# Patient Record
Sex: FEMALE | Race: WHITE | NOT HISPANIC OR LATINO | Employment: UNEMPLOYED | URBAN - METROPOLITAN AREA
[De-identification: names, ages, dates, MRNs, and addresses within clinical notes are randomized per-mention and may not be internally consistent; named-entity substitution may affect disease eponyms.]

---

## 2022-09-09 ENCOUNTER — HOSPITAL ENCOUNTER (EMERGENCY)
Facility: HOSPITAL | Age: 22
Discharge: HOME | End: 2022-09-09
Attending: EMERGENCY MEDICINE | Admitting: EMERGENCY MEDICINE

## 2022-09-09 VITALS
RESPIRATION RATE: 20 BRPM | SYSTOLIC BLOOD PRESSURE: 117 MMHG | BODY MASS INDEX: 54.75 KG/M2 | HEART RATE: 109 BPM | HEIGHT: 61 IN | DIASTOLIC BLOOD PRESSURE: 81 MMHG | TEMPERATURE: 98.3 F | OXYGEN SATURATION: 99 % | WEIGHT: 290 LBS

## 2022-09-09 DIAGNOSIS — L30.9 DERMATITIS: Primary | ICD-10-CM

## 2022-09-09 DIAGNOSIS — H60.12 CELLULITIS OF LEFT EAR: ICD-10-CM

## 2022-09-09 PROCEDURE — 63710000001 PREDNISONE PER 1 MG: Performed by: EMERGENCY MEDICINE

## 2022-09-09 PROCEDURE — 99283 EMERGENCY DEPT VISIT LOW MDM: CPT

## 2022-09-09 RX ORDER — CEPHALEXIN 500 MG/1
500 CAPSULE ORAL 3 TIMES DAILY
Qty: 15 CAPSULE | Refills: 0 | Status: SHIPPED | OUTPATIENT
Start: 2022-09-09 | End: 2022-09-09 | Stop reason: SDUPTHER

## 2022-09-09 RX ORDER — CEPHALEXIN 500 MG/1
500 CAPSULE ORAL 3 TIMES DAILY
Qty: 21 CAPSULE | Refills: 0 | Status: SHIPPED | OUTPATIENT
Start: 2022-09-09

## 2022-09-09 RX ORDER — PREDNISONE 20 MG/1
60 TABLET ORAL ONCE
Status: COMPLETED | OUTPATIENT
Start: 2022-09-09 | End: 2022-09-09

## 2022-09-09 RX ORDER — METHYLPREDNISOLONE 4 MG/1
TABLET ORAL
Qty: 21 TABLET | Refills: 0 | Status: SHIPPED | OUTPATIENT
Start: 2022-09-09

## 2022-09-09 RX ORDER — CEPHALEXIN 500 MG/1
500 CAPSULE ORAL 3 TIMES DAILY
Qty: 21 CAPSULE | Refills: 0 | Status: SHIPPED | OUTPATIENT
Start: 2022-09-09 | End: 2022-09-09 | Stop reason: SDUPTHER

## 2022-09-09 RX ORDER — CEPHALEXIN 500 MG/1
500 CAPSULE ORAL ONCE
Status: COMPLETED | OUTPATIENT
Start: 2022-09-09 | End: 2022-09-09

## 2022-09-09 RX ADMIN — PREDNISONE 60 MG: 20 TABLET ORAL at 20:59

## 2022-09-09 RX ADMIN — CEPHALEXIN 500 MG: 500 CAPSULE ORAL at 20:59

## 2022-09-10 NOTE — ED PROVIDER NOTES
Subjective   PIT  History of Present Illness    Chief complaint: Rash    Location: Left ear, lower abdomen    Quality/Severity: Itches    Timing/Onset/Duration: Started last night    Modifying Factors: Nothing makes it better or worse    Associated Symptoms: No headache.  No fever chills or cough.  No sore throat earache or nasal congestion.  No chest pain or shortness of breath.  No abdominal pain.  No diarrhea or burning when she urinates.  No nausea or vomiting.    Narrative: This 22-year-old female presents with a rash on the left ear, trunk, and below the pannus.  This started last night.  There is been no new lotions, perfumes, soaps, shampoos, detergents or colognes or foods.  The right ear was red last night, tonight its the left ear.    PCP:Nani Wong APRN  History of Present Illness     Medication List      You have not been prescribed any medications.         Review of Systems   Constitutional: Negative for chills and fever.   HENT: Negative for congestion, ear pain and sore throat.    Eyes: Negative for redness.   Respiratory: Negative for cough and shortness of breath.    Cardiovascular: Negative for chest pain.   Gastrointestinal: Negative for abdominal pain, diarrhea, nausea and vomiting.   Genitourinary: Negative for dysuria.   Musculoskeletal: Negative for back pain.   Skin: Positive for rash.   Neurological: Negative for headaches.       Past Medical History:   Diagnosis Date   • Cleft palate    • History of placement of ear tubes        No Known Allergies    History reviewed. No pertinent surgical history.    History reviewed. No pertinent family history.    Social History     Socioeconomic History   • Marital status: Single   Tobacco Use   • Smoking status: Never Smoker   • Smokeless tobacco: Never Used   Substance and Sexual Activity   • Alcohol use: Never   • Drug use: Never           Objective   Physical Exam  Vitals (The temperature is 98.3 °F, pulse 109, respirations 20, /81, room  air pulse ox 99%.) reviewed.   Constitutional:       Comments: Obese   HENT:      Head: Normocephalic and atraumatic.      Right Ear: Tympanic membrane normal.      Ears:      Comments: Congenitally small left external auditory canal     Nose: Nose normal. No congestion or rhinorrhea.      Mouth/Throat:      Mouth: Mucous membranes are moist.      Pharynx: No oropharyngeal exudate or posterior oropharyngeal erythema.   Eyes:      General:         Right eye: No discharge.         Left eye: No discharge.   Cardiovascular:      Rate and Rhythm: Normal rate and regular rhythm.      Pulses: Normal pulses.      Heart sounds: Normal heart sounds. No murmur heard.    No friction rub. No gallop.   Pulmonary:      Effort: Pulmonary effort is normal.   Abdominal:      General: Bowel sounds are normal. There is no distension.      Palpations: There is no mass.      Tenderness: There is no abdominal tenderness. There is no guarding or rebound.      Hernia: No hernia is present.   Musculoskeletal:         General: No swelling or tenderness. Normal range of motion.      Cervical back: Normal range of motion and neck supple. No rigidity.   Skin:     General: Skin is warm and dry.      Findings: Rash (The left ear is erythematous and blanches to the touch.  There is rash underneath the pannus.  There is 1 area on the right side where there is some skin breakdown there is a scattered erythematous rash on the trunk.  There is no purpura or petechiae) present.   Neurological:      General: No focal deficit present.      Mental Status: She is alert and oriented to person, place, and time.      Cranial Nerves: No cranial nerve deficit.      Sensory: No sensory deficit.      Motor: No weakness.         Procedures           ED Course      20:49 EDT, 09/09/22:  The patient's diagnosis of dermatitis was discussed with the patient mother.  Underneath the pannus, the patient should wash this area with soap and and water once a day, pat dry and  apply bacitracin ointment to the wound on the right, and baby powder.  The patient will be given a prescription for Benadryl and a Medrol Dosepak.  The patient should follow-up for recheck with Nani Wong on Monday.  She should return to the emergency department there is fever, chills, shortness of breath, difficulty swallowing or speaking, worse in any way at all.  All the patient's question were answered she will be discharged in good condition.                                     MDM    Final diagnoses:   None       ED Disposition  ED Disposition     None          No follow-up provider specified.       Medication List      No changes were made to your prescriptions during this visit.          Karthik Abbott MD  09/10/22 0045

## 2022-09-10 NOTE — DISCHARGE INSTRUCTIONS
Wash under the pannus once a day with soap and water, pat dry and apply Neosporin ointment or bacitracin to the wound on the right.  Take the Medrol Dosepak as directed.  Take Benadryl as needed as directed for itching.  Follow-up with Nani Wong for recheck on Monday.  Return to emergency department if there is increased itching, difficulty swallowing or speaking, shortness of breath, worse in any way at all.